# Patient Record
Sex: FEMALE | Race: OTHER | HISPANIC OR LATINO
[De-identification: names, ages, dates, MRNs, and addresses within clinical notes are randomized per-mention and may not be internally consistent; named-entity substitution may affect disease eponyms.]

---

## 2017-06-28 ENCOUNTER — APPOINTMENT (OUTPATIENT)
Dept: MAMMOGRAPHY | Facility: CLINIC | Age: 47
End: 2017-06-28

## 2017-06-28 ENCOUNTER — OUTPATIENT (OUTPATIENT)
Dept: OUTPATIENT SERVICES | Facility: HOSPITAL | Age: 47
LOS: 1 days | End: 2017-06-28
Payer: COMMERCIAL

## 2017-06-28 DIAGNOSIS — Z00.8 ENCOUNTER FOR OTHER GENERAL EXAMINATION: ICD-10-CM

## 2017-06-28 PROCEDURE — 77063 BREAST TOMOSYNTHESIS BI: CPT

## 2017-06-28 PROCEDURE — 77067 SCR MAMMO BI INCL CAD: CPT

## 2017-07-11 ENCOUNTER — APPOINTMENT (OUTPATIENT)
Dept: ULTRASOUND IMAGING | Facility: CLINIC | Age: 47
End: 2017-07-11

## 2017-07-18 ENCOUNTER — OUTPATIENT (OUTPATIENT)
Dept: OUTPATIENT SERVICES | Facility: HOSPITAL | Age: 47
LOS: 1 days | End: 2017-07-18
Payer: COMMERCIAL

## 2017-07-18 ENCOUNTER — APPOINTMENT (OUTPATIENT)
Dept: ULTRASOUND IMAGING | Facility: CLINIC | Age: 47
End: 2017-07-18

## 2017-07-18 DIAGNOSIS — Z00.8 ENCOUNTER FOR OTHER GENERAL EXAMINATION: ICD-10-CM

## 2017-07-18 PROCEDURE — 76641 ULTRASOUND BREAST COMPLETE: CPT

## 2018-04-09 ENCOUNTER — APPOINTMENT (OUTPATIENT)
Dept: ULTRASOUND IMAGING | Facility: CLINIC | Age: 48
End: 2018-04-09
Payer: MEDICAID

## 2018-04-09 ENCOUNTER — OUTPATIENT (OUTPATIENT)
Dept: OUTPATIENT SERVICES | Facility: HOSPITAL | Age: 48
LOS: 1 days | End: 2018-04-09
Payer: MEDICAID

## 2018-04-09 ENCOUNTER — APPOINTMENT (OUTPATIENT)
Dept: MAMMOGRAPHY | Facility: CLINIC | Age: 48
End: 2018-04-09
Payer: MEDICAID

## 2018-04-09 DIAGNOSIS — R92.8 OTHER ABNORMAL AND INCONCLUSIVE FINDINGS ON DIAGNOSTIC IMAGING OF BREAST: ICD-10-CM

## 2018-04-09 PROCEDURE — 76641 ULTRASOUND BREAST COMPLETE: CPT | Mod: 26,50

## 2018-04-09 PROCEDURE — 76641 ULTRASOUND BREAST COMPLETE: CPT

## 2018-04-09 PROCEDURE — 77062 BREAST TOMOSYNTHESIS BI: CPT | Mod: 26

## 2018-04-09 PROCEDURE — 77066 DX MAMMO INCL CAD BI: CPT

## 2018-04-09 PROCEDURE — G0279: CPT | Mod: 26

## 2018-04-09 PROCEDURE — G0279: CPT

## 2018-04-09 PROCEDURE — 77066 DX MAMMO INCL CAD BI: CPT | Mod: 26

## 2018-04-30 ENCOUNTER — APPOINTMENT (OUTPATIENT)
Dept: SURGERY | Facility: CLINIC | Age: 48
End: 2018-04-30

## 2018-06-26 ENCOUNTER — APPOINTMENT (OUTPATIENT)
Dept: SURGERY | Facility: CLINIC | Age: 48
End: 2018-06-26
Payer: MEDICAID

## 2018-06-26 VITALS
HEIGHT: 64 IN | OXYGEN SATURATION: 95 % | BODY MASS INDEX: 30.39 KG/M2 | DIASTOLIC BLOOD PRESSURE: 67 MMHG | HEART RATE: 69 BPM | SYSTOLIC BLOOD PRESSURE: 109 MMHG | WEIGHT: 178 LBS

## 2018-06-26 DIAGNOSIS — Z00.00 ENCOUNTER FOR GENERAL ADULT MEDICAL EXAMINATION W/OUT ABNORMAL FINDINGS: ICD-10-CM

## 2018-06-26 DIAGNOSIS — Z83.3 FAMILY HISTORY OF DIABETES MELLITUS: ICD-10-CM

## 2018-06-26 DIAGNOSIS — Z87.891 PERSONAL HISTORY OF NICOTINE DEPENDENCE: ICD-10-CM

## 2018-06-26 DIAGNOSIS — Z82.0 FAMILY HISTORY OF EPILEPSY AND OTHER DISEASES OF THE NERVOUS SYSTEM: ICD-10-CM

## 2018-06-26 DIAGNOSIS — Z82.49 FAMILY HISTORY OF ISCHEMIC HEART DISEASE AND OTHER DISEASES OF THE CIRCULATORY SYSTEM: ICD-10-CM

## 2018-06-26 PROCEDURE — 99204 OFFICE O/P NEW MOD 45 MIN: CPT

## 2018-06-27 PROBLEM — Z82.0 FAMILY HISTORY OF PARKINSONISM: Status: ACTIVE | Noted: 2018-06-26

## 2018-06-27 PROBLEM — Z83.3 FAMILY HISTORY OF DIABETES MELLITUS: Status: ACTIVE | Noted: 2018-06-26

## 2018-06-27 PROBLEM — Z00.00 ENCOUNTER FOR PREVENTIVE HEALTH EXAMINATION: Status: ACTIVE | Noted: 2017-06-23

## 2018-06-27 PROBLEM — Z87.891 FORMER SMOKER: Status: ACTIVE | Noted: 2018-06-26

## 2018-06-27 PROBLEM — Z82.49 FAMILY HISTORY OF HYPERTENSION: Status: ACTIVE | Noted: 2018-06-26

## 2018-06-27 PROBLEM — Z82.0 FAMILY HISTORY OF PARKINSON'S DISEASE: Status: ACTIVE | Noted: 2018-06-26

## 2018-07-03 ENCOUNTER — APPOINTMENT (OUTPATIENT)
Dept: DERMATOLOGY | Facility: CLINIC | Age: 48
End: 2018-07-03

## 2018-11-16 ENCOUNTER — OUTPATIENT (OUTPATIENT)
Dept: OUTPATIENT SERVICES | Facility: HOSPITAL | Age: 48
LOS: 1 days | End: 2018-11-16
Payer: COMMERCIAL

## 2018-11-16 ENCOUNTER — APPOINTMENT (OUTPATIENT)
Dept: ULTRASOUND IMAGING | Facility: CLINIC | Age: 48
End: 2018-11-16
Payer: COMMERCIAL

## 2018-11-16 DIAGNOSIS — R92.8 OTHER ABNORMAL AND INCONCLUSIVE FINDINGS ON DIAGNOSTIC IMAGING OF BREAST: ICD-10-CM

## 2018-11-16 PROCEDURE — 76642 ULTRASOUND BREAST LIMITED: CPT

## 2018-11-16 PROCEDURE — 76642 ULTRASOUND BREAST LIMITED: CPT | Mod: 26,LT

## 2019-08-20 ENCOUNTER — APPOINTMENT (OUTPATIENT)
Dept: ULTRASOUND IMAGING | Facility: CLINIC | Age: 49
End: 2019-08-20
Payer: COMMERCIAL

## 2019-08-20 ENCOUNTER — APPOINTMENT (OUTPATIENT)
Dept: MAMMOGRAPHY | Facility: CLINIC | Age: 49
End: 2019-08-20
Payer: COMMERCIAL

## 2019-08-20 ENCOUNTER — OUTPATIENT (OUTPATIENT)
Dept: OUTPATIENT SERVICES | Facility: HOSPITAL | Age: 49
LOS: 1 days | End: 2019-08-20
Payer: COMMERCIAL

## 2019-08-20 DIAGNOSIS — R92.8 OTHER ABNORMAL AND INCONCLUSIVE FINDINGS ON DIAGNOSTIC IMAGING OF BREAST: ICD-10-CM

## 2019-08-20 PROCEDURE — 77066 DX MAMMO INCL CAD BI: CPT

## 2019-08-20 PROCEDURE — G0279: CPT

## 2019-08-20 PROCEDURE — 77066 DX MAMMO INCL CAD BI: CPT | Mod: 26

## 2019-08-20 PROCEDURE — 76641 ULTRASOUND BREAST COMPLETE: CPT | Mod: 26,50

## 2019-08-20 PROCEDURE — G0279: CPT | Mod: 26

## 2019-08-20 PROCEDURE — 76641 ULTRASOUND BREAST COMPLETE: CPT

## 2020-04-08 ENCOUNTER — APPOINTMENT (OUTPATIENT)
Dept: OBGYN | Facility: CLINIC | Age: 50
End: 2020-04-08
Payer: COMMERCIAL

## 2020-04-08 DIAGNOSIS — E78.5 HYPERLIPIDEMIA, UNSPECIFIED: ICD-10-CM

## 2020-04-08 DIAGNOSIS — Z80.49 FAMILY HISTORY OF MALIGNANT NEOPLASM OF OTHER GENITAL ORGANS: ICD-10-CM

## 2020-04-08 DIAGNOSIS — R73.03 PREDIABETES.: ICD-10-CM

## 2020-04-08 DIAGNOSIS — N39.0 URINARY TRACT INFECTION, SITE NOT SPECIFIED: ICD-10-CM

## 2020-04-08 DIAGNOSIS — Z80.0 FAMILY HISTORY OF MALIGNANT NEOPLASM OF DIGESTIVE ORGANS: ICD-10-CM

## 2020-04-08 LAB
BILIRUB UR QL STRIP: NORMAL
COLLECTION METHOD: NORMAL
DATE COLLECTED: NORMAL
GLUCOSE UR-MCNC: NORMAL
HCG UR QL: 0.2 EU/DL
HEMOCCULT SP1 STL QL: NEGATIVE
HGB UR QL STRIP.AUTO: ABNORMAL
KETONES UR-MCNC: NORMAL
LEUKOCYTE ESTERASE UR QL STRIP: ABNORMAL
NITRITE UR QL STRIP: NORMAL
PH UR STRIP: 6
PROT UR STRIP-MCNC: NORMAL
QUALITY CONTROL: YES
SP GR UR STRIP: 1.02

## 2020-04-08 PROCEDURE — 99386 PREV VISIT NEW AGE 40-64: CPT

## 2020-04-08 PROCEDURE — 82270 OCCULT BLOOD FECES: CPT

## 2020-04-08 PROCEDURE — 99204 OFFICE O/P NEW MOD 45 MIN: CPT | Mod: 25

## 2020-04-08 PROCEDURE — 81003 URINALYSIS AUTO W/O SCOPE: CPT | Mod: QW

## 2020-04-08 NOTE — PHYSICAL EXAM
[Awake] : awake [Alert] : alert [Soft] : soft [Oriented x3] : oriented to person, place, and time [Normal] : uterus [No Bleeding] : there was no active vaginal bleeding [Uterine Adnexae] : were not tender and not enlarged [Acute Distress] : no acute distress [LAD] : no lymphadenopathy [Thyroid Nodule] : no thyroid nodule [Goiter] : no goiter [Mass] : no breast mass [Nipple Discharge] : no nipple discharge [Axillary LAD] : no axillary lymphadenopathy [Tender] : non tender [Distended] : not distended [H/Smegaly] : no hepatosplenomegaly [Depressed Mood] : not depressed [Flat Affect] : affect not flat [Labia Majora] : labia major [Labia Minora] : labia minora [Discharge] : had no discharge [Normal Position] : in a normal position [Tenderness] : nontender [Enlarged ___ wks] : not enlarged [Mass ___ cm] : no uterine mass was palpated [Adnexa Tenderness] : were not tender [Ovarian Mass (___ Cm)] : there were no adnexal masses [No Tenderness] : no rectal tenderness [Occult Blood] : occult blood test from digital rectal exam was negative [de-identified] : breast exam: supine and upright   has bilat breast implants [FreeTextEntry7] : bladder very tender on exam

## 2020-04-10 LAB
APPEARANCE: CLEAR
BACTERIA UR CULT: NORMAL
BACTERIA: NEGATIVE
BILIRUBIN URINE: NEGATIVE
BLOOD URINE: NEGATIVE
C TRACH RRNA SPEC QL NAA+PROBE: NOT DETECTED
COLOR: YELLOW
GLUCOSE QUALITATIVE U: NEGATIVE
HYALINE CASTS: 1 /LPF
KETONES URINE: NEGATIVE
LEUKOCYTE ESTERASE URINE: NEGATIVE
MICROSCOPIC-UA: NORMAL
N GONORRHOEA RRNA SPEC QL NAA+PROBE: NOT DETECTED
NITRITE URINE: NEGATIVE
PH URINE: 6
PROTEIN URINE: NORMAL
RED BLOOD CELLS URINE: 2 /HPF
SOURCE TP AMPLIFICATION: NORMAL
SPECIFIC GRAVITY URINE: 1.03
SQUAMOUS EPITHELIAL CELLS: 3 /HPF
UROBILINOGEN URINE: NORMAL
WHITE BLOOD CELLS URINE: 2 /HPF

## 2020-04-16 LAB
CYTOLOGY CVX/VAG DOC THIN PREP: NORMAL
HPV HIGH+LOW RISK DNA PNL CVX: NOT DETECTED

## 2020-04-20 ENCOUNTER — APPOINTMENT (OUTPATIENT)
Dept: OBGYN | Facility: CLINIC | Age: 50
End: 2020-04-20

## 2020-07-01 ENCOUNTER — APPOINTMENT (OUTPATIENT)
Dept: ULTRASOUND IMAGING | Facility: CLINIC | Age: 50
End: 2020-07-01
Payer: COMMERCIAL

## 2020-07-01 ENCOUNTER — RESULT REVIEW (OUTPATIENT)
Age: 50
End: 2020-07-01

## 2020-07-01 ENCOUNTER — OUTPATIENT (OUTPATIENT)
Dept: OUTPATIENT SERVICES | Facility: HOSPITAL | Age: 50
LOS: 1 days | End: 2020-07-01
Payer: COMMERCIAL

## 2020-07-01 DIAGNOSIS — Z00.00 ENCOUNTER FOR GENERAL ADULT MEDICAL EXAMINATION WITHOUT ABNORMAL FINDINGS: ICD-10-CM

## 2020-07-01 PROCEDURE — 77080 DXA BONE DENSITY AXIAL: CPT | Mod: 26

## 2020-07-01 PROCEDURE — 76700 US EXAM ABDOM COMPLETE: CPT

## 2020-07-01 PROCEDURE — 76830 TRANSVAGINAL US NON-OB: CPT

## 2020-07-01 PROCEDURE — 76856 US EXAM PELVIC COMPLETE: CPT

## 2020-07-01 PROCEDURE — 76700 US EXAM ABDOM COMPLETE: CPT | Mod: 26

## 2020-07-01 PROCEDURE — 76856 US EXAM PELVIC COMPLETE: CPT | Mod: 26

## 2020-07-01 PROCEDURE — 77080 DXA BONE DENSITY AXIAL: CPT

## 2020-07-01 PROCEDURE — 76830 TRANSVAGINAL US NON-OB: CPT | Mod: 26

## 2020-08-22 ENCOUNTER — OUTPATIENT (OUTPATIENT)
Dept: OUTPATIENT SERVICES | Facility: HOSPITAL | Age: 50
LOS: 1 days | End: 2020-08-22
Payer: COMMERCIAL

## 2020-08-22 ENCOUNTER — RESULT REVIEW (OUTPATIENT)
Age: 50
End: 2020-08-22

## 2020-08-22 ENCOUNTER — APPOINTMENT (OUTPATIENT)
Dept: MAMMOGRAPHY | Facility: CLINIC | Age: 50
End: 2020-08-22
Payer: COMMERCIAL

## 2020-08-22 ENCOUNTER — APPOINTMENT (OUTPATIENT)
Dept: ULTRASOUND IMAGING | Facility: CLINIC | Age: 50
End: 2020-08-22
Payer: COMMERCIAL

## 2020-08-22 DIAGNOSIS — Z12.31 ENCOUNTER FOR SCREENING MAMMOGRAM FOR MALIGNANT NEOPLASM OF BREAST: ICD-10-CM

## 2020-08-22 PROCEDURE — 77067 SCR MAMMO BI INCL CAD: CPT | Mod: 26

## 2020-08-22 PROCEDURE — 77067 SCR MAMMO BI INCL CAD: CPT

## 2020-08-22 PROCEDURE — 76641 ULTRASOUND BREAST COMPLETE: CPT | Mod: 26,50

## 2020-08-22 PROCEDURE — 76641 ULTRASOUND BREAST COMPLETE: CPT

## 2020-08-22 PROCEDURE — 77063 BREAST TOMOSYNTHESIS BI: CPT | Mod: 26

## 2020-08-22 PROCEDURE — 77063 BREAST TOMOSYNTHESIS BI: CPT

## 2020-12-23 PROBLEM — N39.0 ACUTE UTI: Status: RESOLVED | Noted: 2020-04-08 | Resolved: 2020-12-23

## 2021-05-13 ENCOUNTER — APPOINTMENT (OUTPATIENT)
Dept: ULTRASOUND IMAGING | Facility: CLINIC | Age: 51
End: 2021-05-13

## 2021-05-14 ENCOUNTER — APPOINTMENT (OUTPATIENT)
Dept: GASTROENTEROLOGY | Facility: CLINIC | Age: 51
End: 2021-05-14

## 2021-08-27 ENCOUNTER — APPOINTMENT (OUTPATIENT)
Dept: GASTROENTEROLOGY | Facility: CLINIC | Age: 51
End: 2021-08-27

## 2021-08-30 ENCOUNTER — APPOINTMENT (OUTPATIENT)
Dept: OBGYN | Facility: CLINIC | Age: 51
End: 2021-08-30
Payer: COMMERCIAL

## 2021-08-30 VITALS
HEIGHT: 64 IN | WEIGHT: 178 LBS | DIASTOLIC BLOOD PRESSURE: 68 MMHG | SYSTOLIC BLOOD PRESSURE: 104 MMHG | BODY MASS INDEX: 30.39 KG/M2

## 2021-08-30 LAB
DATE COLLECTED: NORMAL
HEMOCCULT SP1 STL QL: NEGATIVE
QUALITY CONTROL: YES

## 2021-08-30 PROCEDURE — 82270 OCCULT BLOOD FECES: CPT

## 2021-08-30 PROCEDURE — 99396 PREV VISIT EST AGE 40-64: CPT

## 2021-08-30 NOTE — PHYSICAL EXAM
[Awake] : awake [Alert] : alert [Soft] : soft [Oriented x3] : oriented to person, place, and time [Labia Majora] : labia major [Normal] : uterus [Labia Minora] : labia minora [No Bleeding] : there was no active vaginal bleeding [Normal Position] : in a normal position [Uterine Adnexae] : were not tender and not enlarged [No Tenderness] : no rectal tenderness [Acute Distress] : no acute distress [LAD] : no lymphadenopathy [Thyroid Nodule] : no thyroid nodule [Goiter] : no goiter [Mass] : no breast mass [Nipple Discharge] : no nipple discharge [Axillary LAD] : no axillary lymphadenopathy [Tender] : non tender [Distended] : not distended [H/Smegaly] : no hepatosplenomegaly [Depressed Mood] : not depressed [Flat Affect] : affect not flat [Discharge] : had no discharge [Tenderness] : nontender [Enlarged ___ wks] : not enlarged [Mass ___ cm] : no uterine mass was palpated [Adnexa Tenderness] : were not tender [Occult Blood] : occult blood test from digital rectal exam was negative [Ovarian Mass (___ Cm)] : there were no adnexal masses [de-identified] : breast exam: supine and upright   has bilat breast implants (non textured according to pt) [FreeTextEntry7] : bladder very tender on exam

## 2021-09-03 LAB
APPEARANCE: CLEAR
BACTERIA UR CULT: NORMAL
BACTERIA: NEGATIVE
BILIRUBIN URINE: NEGATIVE
BLOOD URINE: NEGATIVE
C TRACH RRNA SPEC QL NAA+PROBE: NOT DETECTED
COLOR: YELLOW
CYTOLOGY CVX/VAG DOC THIN PREP: NORMAL
GLUCOSE QUALITATIVE U: NEGATIVE
HPV HIGH+LOW RISK DNA PNL CVX: NOT DETECTED
HYALINE CASTS: 3 /LPF
KETONES URINE: NEGATIVE
LEUKOCYTE ESTERASE URINE: ABNORMAL
MICROSCOPIC-UA: NORMAL
N GONORRHOEA RRNA SPEC QL NAA+PROBE: NOT DETECTED
NITRITE URINE: NEGATIVE
PH URINE: 5.5
PROTEIN URINE: NORMAL
RED BLOOD CELLS URINE: 2 /HPF
SOURCE TP AMPLIFICATION: NORMAL
SPECIFIC GRAVITY URINE: 1.03
SQUAMOUS EPITHELIAL CELLS: 5 /HPF
UROBILINOGEN URINE: NORMAL
WHITE BLOOD CELLS URINE: 7 /HPF

## 2021-09-16 ENCOUNTER — APPOINTMENT (OUTPATIENT)
Dept: ULTRASOUND IMAGING | Facility: CLINIC | Age: 51
End: 2021-09-16
Payer: COMMERCIAL

## 2021-09-16 ENCOUNTER — RESULT REVIEW (OUTPATIENT)
Age: 51
End: 2021-09-16

## 2021-09-16 ENCOUNTER — APPOINTMENT (OUTPATIENT)
Dept: MAMMOGRAPHY | Facility: CLINIC | Age: 51
End: 2021-09-16
Payer: COMMERCIAL

## 2021-09-16 ENCOUNTER — OUTPATIENT (OUTPATIENT)
Dept: OUTPATIENT SERVICES | Facility: HOSPITAL | Age: 51
LOS: 1 days | End: 2021-09-16
Payer: COMMERCIAL

## 2021-09-16 DIAGNOSIS — R92.2 INCONCLUSIVE MAMMOGRAM: ICD-10-CM

## 2021-09-16 PROCEDURE — 76856 US EXAM PELVIC COMPLETE: CPT | Mod: 26

## 2021-09-16 PROCEDURE — 76830 TRANSVAGINAL US NON-OB: CPT | Mod: 26

## 2021-09-16 PROCEDURE — 76641 ULTRASOUND BREAST COMPLETE: CPT | Mod: 26,50

## 2021-09-16 PROCEDURE — 77067 SCR MAMMO BI INCL CAD: CPT

## 2021-09-16 PROCEDURE — 77063 BREAST TOMOSYNTHESIS BI: CPT | Mod: 26

## 2021-09-16 PROCEDURE — 77063 BREAST TOMOSYNTHESIS BI: CPT

## 2021-09-16 PROCEDURE — 77067 SCR MAMMO BI INCL CAD: CPT | Mod: 26

## 2021-09-16 PROCEDURE — 76641 ULTRASOUND BREAST COMPLETE: CPT

## 2021-09-16 PROCEDURE — 76830 TRANSVAGINAL US NON-OB: CPT

## 2021-09-16 PROCEDURE — 76856 US EXAM PELVIC COMPLETE: CPT

## 2021-09-24 ENCOUNTER — NON-APPOINTMENT (OUTPATIENT)
Age: 51
End: 2021-09-24

## 2021-11-22 ENCOUNTER — APPOINTMENT (OUTPATIENT)
Dept: GASTROENTEROLOGY | Facility: CLINIC | Age: 51
End: 2021-11-22
Payer: COMMERCIAL

## 2021-11-22 VITALS
OXYGEN SATURATION: 98 % | WEIGHT: 174 LBS | RESPIRATION RATE: 80 BRPM | HEIGHT: 64 IN | DIASTOLIC BLOOD PRESSURE: 70 MMHG | SYSTOLIC BLOOD PRESSURE: 110 MMHG | BODY MASS INDEX: 29.71 KG/M2 | TEMPERATURE: 98 F | HEART RATE: 68 BPM

## 2021-11-22 DIAGNOSIS — Z12.11 ENCOUNTER FOR SCREENING FOR MALIGNANT NEOPLASM OF COLON: ICD-10-CM

## 2021-11-22 PROCEDURE — 99203 OFFICE O/P NEW LOW 30 MIN: CPT

## 2021-11-22 RX ORDER — ATORVASTATIN CALCIUM 10 MG/1
10 TABLET, FILM COATED ORAL
Refills: 0 | Status: ACTIVE | COMMUNITY
Start: 2021-11-22

## 2021-11-22 RX ORDER — METFORMIN HYDROCHLORIDE 625 MG/1
TABLET ORAL
Refills: 0 | Status: DISCONTINUED | COMMUNITY
End: 2021-11-22

## 2021-11-22 RX ORDER — ATORVASTATIN CALCIUM 80 MG/1
TABLET, FILM COATED ORAL
Refills: 0 | Status: DISCONTINUED | COMMUNITY
End: 2021-11-22

## 2021-11-22 NOTE — ASSESSMENT
[FreeTextEntry1] : Patient is a 51 year female, with PMH HLD and DMII, who presents for initial colon cancer screening with colonoscopy. \par \par Colonoscopy to be scheduled. I have discussed the indications, risks and benefits of procedure with patient. Risks include, but not limited to, bleeding, perforation, infection, and reaction to anesthesia. Alternatives to colonoscopy discussed with patient. Patient was given the opportunity to ask questions, all questions were answered. The patient agrees to proceed with colonoscopy. Patient is medically optimized for colonoscopy. \par \par Miralax/dulcolax prep to be used. Bowel prep instructions discussed at length. \par \par CBC/CMP, PT/INR ordered prior to procedure\par \par I evaluated this patient with Rosalinda and agree with the above assessment and management plan for low risk screening colonoscopy in a patient with no previous colonoscopies.  Her ASA classification is 2 she is medically optimized for the proposed colonoscopy and appeared to understand all of the above instructions, information, and management plan which were explained to her today in Bruneian by myself who speaks Bruneian.

## 2021-11-22 NOTE — HISTORY OF PRESENT ILLNESS
[de-identified] : \par Patient is a 51 year female, with PMH HLD, who presents for colon cancer screening. Patient has not had a colonoscopy in the past. Patient denies a family h/o colon polyps or colon cancer. \par \par Patient overall feeling well, asymptomatic. \par \par Patient denies pyrosis, dysphagia, abdominal pain, change in BMs, rectal bleeding, nausea, vomiting, or unexplained weight loss. \par \par Patient denies any significant cardiac or pulmonary conditions.\par \par Patient states her brother had a h/o possible pancreatic cancer but by the time they found out about it, it was metastatic and the primary cancer was not determined, per pt. \par \par \par

## 2021-11-22 NOTE — REASON FOR VISIT
[Consultation] : a consultation visit [[employer] ] : provided by [employer]   [Time Spent: ____ minutes] : Total time spent using  services: [unfilled] minutes. The patient's primary language is not English thus required  services. [FreeTextEntry1] : colon cancer screening  [Interpreters_IDNumber] : [id number] [Interpreters_FullName] : Yesi [TWNoteComboBox1] : Togolese

## 2022-03-08 ENCOUNTER — APPOINTMENT (OUTPATIENT)
Dept: GASTROENTEROLOGY | Facility: GI CENTER | Age: 52
End: 2022-03-08

## 2022-09-15 ENCOUNTER — APPOINTMENT (OUTPATIENT)
Dept: OBGYN | Facility: CLINIC | Age: 52
End: 2022-09-15

## 2022-09-15 VITALS
WEIGHT: 173 LBS | BODY MASS INDEX: 29.53 KG/M2 | HEIGHT: 64 IN | DIASTOLIC BLOOD PRESSURE: 66 MMHG | SYSTOLIC BLOOD PRESSURE: 106 MMHG

## 2022-09-15 DIAGNOSIS — R92.2 INCONCLUSIVE MAMMOGRAM: ICD-10-CM

## 2022-09-15 DIAGNOSIS — Z12.39 ENCOUNTER FOR OTHER SCREENING FOR MALIGNANT NEOPLASM OF BREAST: ICD-10-CM

## 2022-09-15 DIAGNOSIS — Z12.31 ENCOUNTER FOR SCREENING MAMMOGRAM FOR MALIGNANT NEOPLASM OF BREAST: ICD-10-CM

## 2022-09-15 DIAGNOSIS — Z12.11 ENCOUNTER FOR SCREENING FOR MALIGNANT NEOPLASM OF COLON: ICD-10-CM

## 2022-09-15 DIAGNOSIS — Z92.89 PERSONAL HISTORY OF OTHER MEDICAL TREATMENT: ICD-10-CM

## 2022-09-15 DIAGNOSIS — Z13.820 ENCOUNTER FOR SCREENING FOR OSTEOPOROSIS: ICD-10-CM

## 2022-09-15 PROCEDURE — 99396 PREV VISIT EST AGE 40-64: CPT

## 2022-09-15 RX ORDER — EUCALYPTUS OIL/MENTHOL/CAMPHOR 1.2%-4.8%
OINTMENT (GRAM) TOPICAL
Refills: 0 | Status: ACTIVE | COMMUNITY

## 2022-09-15 RX ORDER — CYCLOBENZAPRINE HYDROCHLORIDE 10 MG/1
10 TABLET, FILM COATED ORAL
Qty: 20 | Refills: 0 | Status: DISCONTINUED | COMMUNITY
Start: 2021-07-18 | End: 2022-09-15

## 2022-09-15 RX ORDER — FLUTICASONE PROPIONATE 50 UG/1
50 SPRAY, METERED NASAL
Qty: 16 | Refills: 0 | Status: DISCONTINUED | COMMUNITY
Start: 2021-05-08 | End: 2022-09-15

## 2022-09-15 RX ORDER — IBUPROFEN 800 MG/1
800 TABLET, FILM COATED ORAL
Qty: 30 | Refills: 0 | Status: DISCONTINUED | COMMUNITY
Start: 2021-07-18 | End: 2022-09-15

## 2022-09-15 RX ORDER — PREDNISONE 50 MG/1
50 TABLET ORAL
Qty: 5 | Refills: 0 | Status: DISCONTINUED | COMMUNITY
Start: 2021-07-18 | End: 2022-09-15

## 2022-09-15 RX ORDER — METFORMIN HYDROCHLORIDE 625 MG/1
TABLET ORAL
Refills: 0 | Status: ACTIVE | COMMUNITY

## 2022-09-16 PROBLEM — R92.2 DENSE BREASTS: Status: RESOLVED | Noted: 2020-04-08 | Resolved: 2022-09-16

## 2022-09-16 LAB — HPV HIGH+LOW RISK DNA PNL CVX: NOT DETECTED

## 2022-09-16 NOTE — PHYSICAL EXAM
[Appropriately responsive] : appropriately responsive [Alert] : alert [No Acute Distress] : no acute distress [Soft] : soft [Non-tender] : non-tender [Non-distended] : non-distended [Oriented x3] : oriented x3 [Examination Of The Breasts] : a normal appearance [No Discharge] : no discharge [No Masses] : no breast masses were palpable [Labia Majora] : normal [Labia Minora] : normal [No Bleeding] : There was no active vaginal bleeding [Normal] : normal [Uterine Adnexae] : normal [] : implants [Atrophy] : atrophy [Dry Mucosa] : dry mucosa [FreeTextEntry9] : Stool for occult blood.

## 2022-09-16 NOTE — HISTORY OF PRESENT ILLNESS
[postmenopausal] : postmenopausal [N] : Patient is not sexually active [Y] : Positive pregnancy history [unknown] : Patient is unsure of the date of her LMP [Previously active] : previously active [LMP unknown] : LMP unknown [FreeTextEntry1] : ADALI 53 yo  LMP unknown is here today for an annual exam. She is doing well with no gynecological complaints. \par \par Last mammogram and breast sonogram on 2021 revealed BR2. \par \par Last pap smear on 2022 was normal, HPV negative. \par  [Mammogramdate] : 9/17/2021 [TextBox_19] : B2 [BreastSonogramDate] : 9/17/2021 [TextBox_25] : B2 [PapSmeardate] : 8/30/2021 [TextBox_31] : WNL [GonorrheaDate] : 8/30/2021 [TextBox_63] : NEG [ChlamydiaDate] : 8/30/2021 [TextBox_68] : NEG [HPVDate] : 8/30/2021 [TextBox_78] : NEG [PGHxTotal] : 2 [[facility] Center[facility]ulerm] : 1 [[facility]] : 1 [PGHxABSpont] : 1

## 2022-09-16 NOTE — END OF VISIT
[FreeTextEntry3] : I, Tari Keller solely acted as scribe for Dr. Rosalia Alvarez on 09/15/2022 \par All medical entries made by the Scribe were at my, Dr. Alvarez’s, direction and personally\par dictated by me on 09/15/2022 . I have reviewed the chart and agree that the record\par accurately reflects my personal performance of the history, physical exam, assessment and plan. I\par have also personally directed, reviewed, and agreed with the chart.

## 2022-09-16 NOTE — DISCUSSION/SUMMARY
[FreeTextEntry1] : Benign breast and pelvic exam. Bilateral implants noted. Pap done today. Stool for occult blood was negative.\par \par Prescription for mammogram screening and breast US given.\par \par Self-breast exam reviewed.\par \par She will follow up annually and as needed.\par

## 2022-09-22 LAB — CYTOLOGY CVX/VAG DOC THIN PREP: NORMAL

## 2022-10-01 ENCOUNTER — RESULT REVIEW (OUTPATIENT)
Age: 52
End: 2022-10-01

## 2022-10-01 ENCOUNTER — APPOINTMENT (OUTPATIENT)
Dept: MAMMOGRAPHY | Facility: CLINIC | Age: 52
End: 2022-10-01

## 2022-10-01 ENCOUNTER — OUTPATIENT (OUTPATIENT)
Dept: OUTPATIENT SERVICES | Facility: HOSPITAL | Age: 52
LOS: 1 days | End: 2022-10-01
Payer: MEDICAID

## 2022-10-01 ENCOUNTER — APPOINTMENT (OUTPATIENT)
Dept: ULTRASOUND IMAGING | Facility: CLINIC | Age: 52
End: 2022-10-01

## 2022-10-01 DIAGNOSIS — Z00.8 ENCOUNTER FOR OTHER GENERAL EXAMINATION: ICD-10-CM

## 2022-10-01 DIAGNOSIS — R92.8 OTHER ABNORMAL AND INCONCLUSIVE FINDINGS ON DIAGNOSTIC IMAGING OF BREAST: ICD-10-CM

## 2022-10-01 PROCEDURE — 76641 ULTRASOUND BREAST COMPLETE: CPT

## 2022-10-01 PROCEDURE — 77067 SCR MAMMO BI INCL CAD: CPT

## 2022-10-01 PROCEDURE — 76641 ULTRASOUND BREAST COMPLETE: CPT | Mod: 26,50

## 2022-10-01 PROCEDURE — 77063 BREAST TOMOSYNTHESIS BI: CPT | Mod: 26

## 2022-10-01 PROCEDURE — 77063 BREAST TOMOSYNTHESIS BI: CPT

## 2022-10-01 PROCEDURE — 77067 SCR MAMMO BI INCL CAD: CPT | Mod: 26

## 2022-10-05 DIAGNOSIS — N63.0 UNSPECIFIED LUMP IN UNSPECIFIED BREAST: ICD-10-CM

## 2022-10-12 ENCOUNTER — NON-APPOINTMENT (OUTPATIENT)
Age: 52
End: 2022-10-12

## 2022-10-12 ENCOUNTER — APPOINTMENT (OUTPATIENT)
Dept: ULTRASOUND IMAGING | Facility: CLINIC | Age: 52
End: 2022-10-12

## 2022-10-12 ENCOUNTER — OUTPATIENT (OUTPATIENT)
Dept: OUTPATIENT SERVICES | Facility: HOSPITAL | Age: 52
LOS: 1 days | End: 2022-10-12
Payer: COMMERCIAL

## 2022-10-12 ENCOUNTER — RESULT REVIEW (OUTPATIENT)
Age: 52
End: 2022-10-12

## 2022-10-12 DIAGNOSIS — N64.89 OTHER SPECIFIED DISORDERS OF BREAST: ICD-10-CM

## 2022-10-12 PROCEDURE — 76642 ULTRASOUND BREAST LIMITED: CPT | Mod: 26,LT

## 2022-10-12 PROCEDURE — 76642 ULTRASOUND BREAST LIMITED: CPT

## 2023-05-19 ENCOUNTER — OFFICE (OUTPATIENT)
Dept: URBAN - METROPOLITAN AREA CLINIC 94 | Facility: CLINIC | Age: 53
Setting detail: OPHTHALMOLOGY
End: 2023-05-19
Payer: COMMERCIAL

## 2023-05-19 DIAGNOSIS — H01.001: ICD-10-CM

## 2023-05-19 DIAGNOSIS — E11.9: ICD-10-CM

## 2023-05-19 DIAGNOSIS — H01.004: ICD-10-CM

## 2023-05-19 DIAGNOSIS — H52.4: ICD-10-CM

## 2023-05-19 DIAGNOSIS — H04.123: ICD-10-CM

## 2023-05-19 PROBLEM — H25.13 CATARACT SENILE NUCLEAR SCLEROSIS; BOTH EYES: Status: ACTIVE | Noted: 2023-05-19

## 2023-05-19 PROCEDURE — 92250 FUNDUS PHOTOGRAPHY W/I&R: CPT | Performed by: OPHTHALMOLOGY

## 2023-05-19 PROCEDURE — 92004 COMPRE OPH EXAM NEW PT 1/>: CPT | Performed by: OPHTHALMOLOGY

## 2023-05-19 PROCEDURE — 92015 DETERMINE REFRACTIVE STATE: CPT | Performed by: OPHTHALMOLOGY

## 2023-05-19 ASSESSMENT — REFRACTION_CURRENTRX
OD_VPRISM_DIRECTION: PROGS
OD_ADD: +1.00
OD_AXIS: 106
OS_OVR_VA: 20/
OS_VPRISM_DIRECTION: PROGS
OD_CYLINDER: -1.00
OS_ADD: +1.00
OD_SPHERE: PLANO
OS_CYLINDER: -1.00
OS_SPHERE: -0.25
OS_AXIS: 088
OD_OVR_VA: 20/

## 2023-05-19 ASSESSMENT — REFRACTION_MANIFEST
OD_AXIS: 100
OS_VA2: 20/20
OS_SPHERE: PL
OD_SPHERE: PL
OD_AXIS: 95
OS_VA1: 20/20
OD_CYLINDER: -1.25
OS_CYLINDER: -0.75
OD_ADD: +1.50
OD_VA1: 20/20
OD_CYLINDER: -1.25
OD_ADD: +1.75
OS_CYLINDER: -1.25
OD_VA2: 20/20
OS_ADD: +1.75
OU_VA: 20/20
OS_AXIS: 85
OS_AXIS: 080
OS_VA1: 20/20
OS_SPHERE: PLANO
OD_SPHERE: +0.50
OD_VA1: 20/20
OS_ADD: +1.50

## 2023-05-19 ASSESSMENT — REFRACTION_AUTOREFRACTION
OD_CYLINDER: -1.50
OD_AXIS: 099
OS_AXIS: 078
OS_SPHERE: +0.25
OD_SPHERE: +0.75
OS_CYLINDER: -1.00

## 2023-05-19 ASSESSMENT — SPHEQUIV_DERIVED
OS_SPHEQUIV: -0.25
OD_SPHEQUIV: -0.125
OD_SPHEQUIV: 0

## 2023-05-19 ASSESSMENT — AXIALLENGTH_DERIVED
OD_AL: 23.3673
OD_AL: 23.3196
OS_AL: 23.4152

## 2023-05-19 ASSESSMENT — KERATOMETRY
OS_K2POWER_DIOPTERS: 44.50
OD_K2POWER_DIOPTERS: 44.50
OD_K1POWER_DIOPTERS: 44.00
OD_AXISANGLE_DEGREES: 101
OS_AXISANGLE_DEGREES: 128
OS_K1POWER_DIOPTERS: 44.00

## 2023-05-19 ASSESSMENT — LID EXAM ASSESSMENTS
OD_BLEPHARITIS: RUL T
OS_BLEPHARITIS: LUL T

## 2023-05-19 ASSESSMENT — TONOMETRY
OD_IOP_MMHG: 18
OS_IOP_MMHG: 17

## 2023-05-19 ASSESSMENT — CONFRONTATIONAL VISUAL FIELD TEST (CVF)
OS_FINDINGS: FULL
OD_FINDINGS: FULL

## 2023-05-19 ASSESSMENT — VISUAL ACUITY
OS_BCVA: 20/25
OD_BCVA: 20/25

## 2024-02-26 ENCOUNTER — RESULT REVIEW (OUTPATIENT)
Age: 54
End: 2024-02-26

## 2024-02-26 ENCOUNTER — APPOINTMENT (OUTPATIENT)
Dept: RADIOLOGY | Facility: CLINIC | Age: 54
End: 2024-02-26
Payer: COMMERCIAL

## 2024-02-26 ENCOUNTER — OUTPATIENT (OUTPATIENT)
Dept: OUTPATIENT SERVICES | Facility: HOSPITAL | Age: 54
LOS: 1 days | End: 2024-02-26
Payer: COMMERCIAL

## 2024-02-26 ENCOUNTER — APPOINTMENT (OUTPATIENT)
Dept: MAMMOGRAPHY | Facility: CLINIC | Age: 54
End: 2024-02-26
Payer: COMMERCIAL

## 2024-02-26 ENCOUNTER — APPOINTMENT (OUTPATIENT)
Dept: ULTRASOUND IMAGING | Facility: CLINIC | Age: 54
End: 2024-02-26
Payer: COMMERCIAL

## 2024-02-26 ENCOUNTER — NON-APPOINTMENT (OUTPATIENT)
Age: 54
End: 2024-02-26

## 2024-02-26 DIAGNOSIS — Z12.39 ENCOUNTER FOR OTHER SCREENING FOR MALIGNANT NEOPLASM OF BREAST: ICD-10-CM

## 2024-02-26 PROCEDURE — 76641 ULTRASOUND BREAST COMPLETE: CPT | Mod: 26,50

## 2024-02-26 PROCEDURE — 77063 BREAST TOMOSYNTHESIS BI: CPT | Mod: 26

## 2024-02-26 PROCEDURE — 77067 SCR MAMMO BI INCL CAD: CPT | Mod: 26

## 2024-02-26 PROCEDURE — 77063 BREAST TOMOSYNTHESIS BI: CPT

## 2024-02-26 PROCEDURE — 76641 ULTRASOUND BREAST COMPLETE: CPT

## 2024-02-26 PROCEDURE — 77085 DXA BONE DENSITY AXL VRT FX: CPT

## 2024-02-26 PROCEDURE — 77085 DXA BONE DENSITY AXL VRT FX: CPT | Mod: 26

## 2024-02-26 PROCEDURE — 77067 SCR MAMMO BI INCL CAD: CPT

## 2024-03-01 ENCOUNTER — NON-APPOINTMENT (OUTPATIENT)
Age: 54
End: 2024-03-01

## 2024-03-18 ENCOUNTER — OFFICE (OUTPATIENT)
Dept: URBAN - METROPOLITAN AREA CLINIC 116 | Facility: CLINIC | Age: 54
Setting detail: OPHTHALMOLOGY
End: 2024-03-18
Payer: COMMERCIAL

## 2024-03-18 DIAGNOSIS — E11.9: ICD-10-CM

## 2024-03-18 DIAGNOSIS — H01.004: ICD-10-CM

## 2024-03-18 DIAGNOSIS — H25.13: ICD-10-CM

## 2024-03-18 DIAGNOSIS — H04.123: ICD-10-CM

## 2024-03-18 DIAGNOSIS — H01.001: ICD-10-CM

## 2024-03-18 PROCEDURE — 92250 FUNDUS PHOTOGRAPHY W/I&R: CPT | Performed by: OPTOMETRIST

## 2024-03-18 PROCEDURE — 92014 COMPRE OPH EXAM EST PT 1/>: CPT | Performed by: OPTOMETRIST

## 2024-03-18 ASSESSMENT — REFRACTION_MANIFEST
OS_VA1: 20/20
OD_ADD: +1.50
OD_ADD: +2.00
OD_AXIS: 95
OS_VA1: 20/20
OD_ADD: +1.75
OS_CYLINDER: -0.75
OS_CYLINDER: -1.25
OD_AXIS: 100
OS_VA1: 20/20
OD_VA1: 20/20
OD_VA2: 20/20
OD_VA1: 20/20
OS_AXIS: 080
OU_VA: 20/20
OD_CYLINDER: -1.25
OS_ADD: +2.00
OD_CYLINDER: -1.25
OS_SPHERE: PLANO
OS_SPHERE: PL
OS_ADD: +1.75
OD_SPHERE: PL
OS_AXIS: 85
OS_ADD: +1.50
OS_AXIS: 080
OD_CYLINDER: -1.00
OD_AXIS: 100
OD_VA1: 20/20
OS_VA2: 20/20
OD_SPHERE: +0.25
OD_SPHERE: +0.50
OS_SPHERE: +0.25
OS_CYLINDER: -0.75

## 2024-03-18 ASSESSMENT — SPHEQUIV_DERIVED
OD_SPHEQUIV: -0.125
OS_SPHEQUIV: -0.125
OD_SPHEQUIV: -0.25

## 2024-03-18 ASSESSMENT — REFRACTION_CURRENTRX
OS_AXIS: 080
OD_OVR_VA: 20/
OS_CYLINDER: -1.00
OD_CYLINDER: -1.00
OD_VPRISM_DIRECTION: PROGS
OD_CYLINDER: -1.25
OD_AXIS: 106
OD_SPHERE: PLANO
OD_SPHERE: +0.75
OD_ADD: +1.50
OS_OVR_VA: 20/
OD_OVR_VA: 20/
OS_ADD: +1.50
OS_SPHERE: -0.25
OS_CYLINDER: -0.75
OS_VPRISM_DIRECTION: PROGS
OS_AXIS: 088
OS_OVR_VA: 20/
OS_ADD: +1.00
OS_VPRISM_DIRECTION: PROGS
OD_VPRISM_DIRECTION: PROGS
OD_ADD: +1.00
OD_AXIS: 100
OS_SPHERE: +0.25

## 2024-03-18 ASSESSMENT — LID EXAM ASSESSMENTS
OD_BLEPHARITIS: RUL T
OS_BLEPHARITIS: LUL T

## 2024-04-08 ENCOUNTER — APPOINTMENT (OUTPATIENT)
Dept: OBGYN | Facility: CLINIC | Age: 54
End: 2024-04-08
Payer: COMMERCIAL

## 2024-04-08 VITALS
HEIGHT: 64 IN | DIASTOLIC BLOOD PRESSURE: 64 MMHG | BODY MASS INDEX: 28.92 KG/M2 | WEIGHT: 169.4 LBS | SYSTOLIC BLOOD PRESSURE: 108 MMHG

## 2024-04-08 DIAGNOSIS — Z98.82 BREAST IMPLANT STATUS: ICD-10-CM

## 2024-04-08 DIAGNOSIS — R10.32 LEFT LOWER QUADRANT PAIN: ICD-10-CM

## 2024-04-08 DIAGNOSIS — R10.2 PELVIC AND PERINEAL PAIN: ICD-10-CM

## 2024-04-08 DIAGNOSIS — Z12.31 ENCOUNTER FOR SCREENING MAMMOGRAM FOR MALIGNANT NEOPLASM OF BREAST: ICD-10-CM

## 2024-04-08 DIAGNOSIS — R92.30 DENSE BREASTS, UNSPECIFIED: ICD-10-CM

## 2024-04-08 DIAGNOSIS — Z12.11 ENCOUNTER FOR SCREENING FOR MALIGNANT NEOPLASM OF COLON: ICD-10-CM

## 2024-04-08 PROCEDURE — 99214 OFFICE O/P EST MOD 30 MIN: CPT | Mod: 25

## 2024-04-08 PROCEDURE — 99396 PREV VISIT EST AGE 40-64: CPT

## 2024-04-08 PROCEDURE — 99459 PELVIC EXAMINATION: CPT

## 2024-04-09 LAB — HPV HIGH+LOW RISK DNA PNL CVX: NOT DETECTED

## 2024-04-10 LAB — BACTERIA UR CULT: NORMAL

## 2024-04-12 LAB — CYTOLOGY CVX/VAG DOC THIN PREP: ABNORMAL

## 2024-04-13 PROBLEM — R10.32 COLICKY LLQ ABDOMINAL PAIN: Status: RESOLVED | Noted: 2020-04-08 | Resolved: 2024-04-13

## 2024-04-13 PROBLEM — R10.2 PELVIC PAIN: Status: RESOLVED | Noted: 2020-04-08 | Resolved: 2024-04-13

## 2024-04-13 NOTE — PLAN
[FreeTextEntry1] : Pelvic pain of unknown etiology: will check a urine culture and a pelvic sono.   Sebaceous cyst noted in LT groin. Recommended warm soaks.   Pap done today.  Prescription for mammogram screening and breast US given. Prescription also given for a breast MRI secondary to  possible ruptured implants. Referral for plastics given secondary to possible ruptured silicone implants.   Prescription for a transvaginal sonogram given secondary to LLQ pain.   Self-breast exam reviewed.  F/u for pelvic sono or as needed.

## 2024-04-13 NOTE — PHYSICAL EXAM
[Chaperone Present] : A chaperone was present in the examining room during all aspects of the physical examination [Appropriately responsive] : appropriately responsive [Alert] : alert [No Acute Distress] : no acute distress [Soft] : soft [Non-tender] : non-tender [Non-distended] : non-distended [Oriented x3] : oriented x3 [Examination Of The Breasts] : a normal appearance [No Discharge] : no discharge [No Masses] : no breast masses were palpable [Labia Majora] : normal [Labia Minora] : normal [No Bleeding] : There was no active vaginal bleeding [Normal] : normal [Uterine Adnexae] : normal [] : implants [Atrophy] : atrophy [Dry Mucosa] : dry mucosa [FreeTextEntry1] : Sebaceous cyst noted.  [FreeTextEntry5] : Unable to enter cervical brush, secondary to stenotic os.

## 2024-04-13 NOTE — REASON FOR VISIT
[Annual] : an annual visit. [FreeTextEntry2] : PT C/O OF LOWER GROIN AIN. ALSO HAS SMALL BUMPS ON INNER GROIN (TREATING WITH BACITRACIN)

## 2024-04-13 NOTE — HISTORY OF PRESENT ILLNESS
[Y] : Positive pregnancy history [Menarche Age: ____] : age at menarche was [unfilled] [Currently Active] : currently active [FreeTextEntry1] : ADALI 52 yo  LMP  is here today for an annual exam. She is sexually active but denies any vaginal dryness symptoms.   Patient complains of LT lower groin pain for about one month. Also notes some small bumps on the inner part of her groin that come out when she is stressed out. She is putting Bacitracin ointment on the bumps. Denies any pain with urination.   Last mammogram and breast sonogram on 2024 revealed BR2.  Last pap smear on 09/15/2022 was normal, HPV negative.   Last DEXA scan on 24 was normal.  Last ColoGard was in  as per patient.  [Mammogramdate] : 2/26/24 [TextBox_19] : br2 [BreastSonogramDate] : 2/26/24 [TextBox_25] : br2 [PapSmeardate] : 9/15/22 [TextBox_31] : wnl [BoneDensityDate] : 2/26/24 [TextBox_37] : normal [ColonoscopyDate] : [facility]- 2024 [TextBox_43] : WNL PER PT  [HPVDate] : 9/15/22 [TextBox_78] : neg [LMPDate] : 2008 [PGHxTotal] : 2 [[facility] HospitalxFulerm] : 1 [[facility]iving] : 1 [PGHxABSpont] : 1

## 2024-04-13 NOTE — END OF VISIT
[FreeTextEntry3] : I, Tari Keller solely acted as scribe for Dr. Rosalia Alvarez on 04/08/2024  All medical entries made by the Scribe were at my, Dr. Morton, direction and personally dictated by me on 04/08/2024 . I have reviewed the chart and agree that the record accurately reflects my personal performance of the history, physical exam, assessment and plan. I have also personally directed, reviewed, and agreed with the chart.

## 2024-06-21 ENCOUNTER — APPOINTMENT (OUTPATIENT)
Dept: ANTEPARTUM | Facility: CLINIC | Age: 54
End: 2024-06-21
Payer: COMMERCIAL

## 2024-06-21 ENCOUNTER — APPOINTMENT (OUTPATIENT)
Dept: OBGYN | Facility: CLINIC | Age: 54
End: 2024-06-21
Payer: COMMERCIAL

## 2024-06-21 ENCOUNTER — ASOB RESULT (OUTPATIENT)
Age: 54
End: 2024-06-21

## 2024-06-21 VITALS
SYSTOLIC BLOOD PRESSURE: 108 MMHG | HEIGHT: 64 IN | DIASTOLIC BLOOD PRESSURE: 68 MMHG | BODY MASS INDEX: 29.02 KG/M2 | WEIGHT: 170 LBS

## 2024-06-21 DIAGNOSIS — R10.32 LEFT LOWER QUADRANT PAIN: ICD-10-CM

## 2024-06-21 DIAGNOSIS — Z01.411 ENCOUNTER FOR GYNECOLOGICAL EXAMINATION (GENERAL) (ROUTINE) WITH ABNORMAL FINDINGS: ICD-10-CM

## 2024-06-21 DIAGNOSIS — N39.3 STRESS INCONTINENCE (FEMALE) (MALE): ICD-10-CM

## 2024-06-21 DIAGNOSIS — Z01.419 ENCOUNTER FOR GYNECOLOGICAL EXAMINATION (GENERAL) (ROUTINE) W/OUT ABNORMAL FINDINGS: ICD-10-CM

## 2024-06-21 DIAGNOSIS — R92.8 OTHER ABNORMAL AND INCONCLUSIVE FINDINGS ON DIAGNOSTIC IMAGING OF BREAST: ICD-10-CM

## 2024-06-21 DIAGNOSIS — R10.2 PELVIC AND PERINEAL PAIN: ICD-10-CM

## 2024-06-21 DIAGNOSIS — N94.10 UNSPECIFIED DYSPAREUNIA: ICD-10-CM

## 2024-06-21 DIAGNOSIS — N64.59 OTHER SIGNS AND SYMPTOMS IN BREAST: ICD-10-CM

## 2024-06-21 PROCEDURE — 76830 TRANSVAGINAL US NON-OB: CPT

## 2024-06-21 PROCEDURE — 99214 OFFICE O/P EST MOD 30 MIN: CPT

## 2024-06-21 PROCEDURE — 76856 US EXAM PELVIC COMPLETE: CPT | Mod: 59

## 2024-06-21 RX ORDER — ESTRADIOL 10 UG/1
10 TABLET VAGINAL
Qty: 54 | Refills: 1 | Status: ACTIVE | COMMUNITY
Start: 2024-06-21 | End: 1900-01-01

## 2024-06-23 PROBLEM — Z01.419 ENCOUNTER FOR WELL WOMAN EXAM WITH ROUTINE GYNECOLOGICAL EXAM: Status: RESOLVED | Noted: 2022-09-15 | Resolved: 2024-06-23

## 2024-06-23 PROBLEM — R92.8 ABNORMAL FINDING ON BREAST IMAGING: Status: RESOLVED | Noted: 2018-06-27 | Resolved: 2024-06-23

## 2024-06-23 NOTE — HISTORY OF PRESENT ILLNESS
[postmenopausal] : postmenopausal [N] : Patient does not use contraception [Y] : Positive pregnancy history [No] : Patient does not have concerns regarding sex [Currently Active] : currently active [Men] : men [Mammogramdate] : 02/26/24 [TextBox_19] : BR2 [BreastSonogramDate] : 02/26/24 [TextBox_25] : BR2 [PapSmeardate] : 04/08/24 [TextBox_31] : WNL [BoneDensityDate] : 02/26/24 [TextBox_37] : NORMAL [ColonoscopyDate] : [facility] 2024 [HPVDate] : 04/08/24 [TextBox_78] : NEG [LMPDate] : 2008 [PGHxTotal] : 2 [[facility] HospitalxFulerm] : 1 [[facility] Centeriving] : 1 [PGHxABSpont] : 1 [FreeTextEntry1] : 2008

## 2024-06-23 NOTE — PLAN
[FreeTextEntry1] : Sono results were wnl. We discussed my recommendation to consult with her PCP if the pelvic pain continues. Repeat gynecologic imaging not currently recommended.   We discussed that her vaginal pain may be from vaginal dryness. Painful sex is likely secondary to vaginal atrophy which is common in perimenopause and menopause, this is referred to as genitourinary syndrome of menopause.  We discussed menopausal changes in the vagina and bladder secondary to the significant reduction in estrogen. I explained that unlike the other symptoms of menopause that typically resolve over time, genitourinary syndrome of menopause typically worsens if left untreated.   We discussed the use of over the counter vaginal lubricants for intercourse, including the use of coconut oil both internally and externally.   We also discussed the benefits of over-the-counter vaginal moisturizers. We discussed the fact that these are recommended to be used regularly 2-3 times a week for maximum benefit.   We also discussed the benefits and roles of local menopausal hormone therapy via vaginal estrogen. She is aware that there is a minimal to insignificant absorption into the bloodstream with vaginal estrogen. This is regarded as very safe by the FDA. We even discussed that ACOG recommends it as a treatment option even in women with a history of breast cancer.   We discussed the different formulations of vaginal estrogen therapy including estrogen cream, vaginal estrogen tablets, and a vaginal estrogen ring. Prescription was sent to the pharmacy for vagifem. She is aware that for the first 2 weeks, she needs to insert the tablet vaginally nightly, and then after the first 2 weeks it is 2 times per week. She is aware that she may not have a significant improvement in symptoms for 3 months. If there is not significant improvement after 3 months, she was instructed to follow up. She is aware that she can also use both vaginal moisturizers and vaginal estrogen for added improvement. She is aware she would need to alternate the insertion of both, and I recommend against inserting both at the same time.   She is aware that if she has any vaginal bleeding after the first 3 months of initiating treatment, she should return for a uterine evaluation. She is also aware that she may still require lubrication for intercourse. Questions answered.  F/u annually or as needed.

## 2024-06-23 NOTE — END OF VISIT
[FreeTextEntry3] : I, Tari Keller solely acted as scribe for Dr. Rosalia Alvarez on 06/21/2024  All medical entries made by the Scribe were at my, Dr. Morton, direction and personally dictated by me on 06/21/2024 . I have reviewed the chart and agree that the record accurately reflects my personal performance of the history, physical exam, assessment and plan. I have also personally directed, reviewed, and agreed with the chart.

## 2024-06-23 NOTE — REVIEW OF SYSTEMS
[Pelvic pain] : pelvic pain [Incontinence] : incontinence [Genital Rash/Irritation] : genital rash/irritation

## 2024-10-29 ENCOUNTER — APPOINTMENT (OUTPATIENT)
Dept: ULTRASOUND IMAGING | Facility: CLINIC | Age: 54
End: 2024-10-29
Payer: COMMERCIAL

## 2024-10-29 ENCOUNTER — OUTPATIENT (OUTPATIENT)
Dept: OUTPATIENT SERVICES | Facility: HOSPITAL | Age: 54
LOS: 1 days | End: 2024-10-29
Payer: COMMERCIAL

## 2024-10-29 ENCOUNTER — APPOINTMENT (OUTPATIENT)
Dept: RADIOLOGY | Facility: CLINIC | Age: 54
End: 2024-10-29
Payer: COMMERCIAL

## 2024-10-29 DIAGNOSIS — Z00.8 ENCOUNTER FOR OTHER GENERAL EXAMINATION: ICD-10-CM

## 2024-10-29 PROCEDURE — 76641 ULTRASOUND BREAST COMPLETE: CPT

## 2024-10-29 PROCEDURE — 73130 X-RAY EXAM OF HAND: CPT

## 2024-10-29 PROCEDURE — 73130 X-RAY EXAM OF HAND: CPT | Mod: 26,50

## 2024-10-29 PROCEDURE — 76641 ULTRASOUND BREAST COMPLETE: CPT | Mod: 26,LT

## 2025-04-14 ENCOUNTER — OFFICE (OUTPATIENT)
Dept: URBAN - METROPOLITAN AREA CLINIC 116 | Facility: CLINIC | Age: 55
Setting detail: OPHTHALMOLOGY
End: 2025-04-14
Payer: COMMERCIAL

## 2025-04-14 VITALS — HEIGHT: 55 IN

## 2025-04-14 DIAGNOSIS — H52.4: ICD-10-CM

## 2025-04-14 DIAGNOSIS — H01.001: ICD-10-CM

## 2025-04-14 DIAGNOSIS — H04.123: ICD-10-CM

## 2025-04-14 DIAGNOSIS — H25.13: ICD-10-CM

## 2025-04-14 PROBLEM — E11.9 DIABETES TYPE 2 NO RETINOPATHY; BOTH MILD WITHOUT ME; 
BOTH EYES: Status: ACTIVE | Noted: 2025-04-14

## 2025-04-14 PROBLEM — E11.3293 DIABETES TYPE 2 NO RETINOPATHY; BOTH MILD WITHOUT ME; 
BOTH EYES: Status: ACTIVE | Noted: 2025-04-14

## 2025-04-14 PROCEDURE — 92015 DETERMINE REFRACTIVE STATE: CPT | Performed by: OPTOMETRIST

## 2025-04-14 PROCEDURE — 92250 FUNDUS PHOTOGRAPHY W/I&R: CPT | Performed by: OPTOMETRIST

## 2025-04-14 PROCEDURE — 92014 COMPRE OPH EXAM EST PT 1/>: CPT | Performed by: OPTOMETRIST

## 2025-04-14 ASSESSMENT — REFRACTION_MANIFEST
OS_VA1: 20/20
OD_VA1: 20/20
OS_SPHERE: PLANO
OS_VA1: 20/20
OD_SPHERE: +0.25
OD_SPHERE: PL
OS_AXIS: 85
OD_ADD: +2.25
OS_ADD: +2.25
OS_AXIS: 080
OS_SPHERE: +0.25
OS_CYLINDER: -0.75
OD_VA1: 20/20
OD_CYLINDER: -1.00
OD_AXIS: 100
OS_AXIS: 080
OS_CYLINDER: -0.75
OD_CYLINDER: -1.25
OD_ADD: +1.50
OD_ADD: +2.00
OS_ADD: +1.50
OS_ADD: +1.75
OD_CYLINDER: -1.00
OS_VA1: 20/20
OD_AXIS: 100
OS_SPHERE: +0.25
OD_AXIS: 95
OS_VA2: 20/20
OD_ADD: +1.75
OS_SPHERE: PL
OD_VA1: 20/20
OD_CYLINDER: -1.25
OS_AXIS: 080
OS_ADD: +2.00
OU_VA: 20/20
OD_VA1: 20/20
OD_AXIS: 100
OD_SPHERE: +0.25
OD_SPHERE: +0.50
OD_VA2: 20/20
OS_CYLINDER: -1.25
OS_VA1: 20/20
OS_CYLINDER: -0.75

## 2025-04-14 ASSESSMENT — REFRACTION_CURRENTRX
OS_ADD: +1.50
OS_ADD: +1.50
OS_AXIS: 077
OS_AXIS: 080
OD_SPHERE: +0.50
OD_AXIS: 102
OS_OVR_VA: 20/
OD_ADD: +1.50
OS_SPHERE: PLANO
OD_ADD: +1.50
OD_CYLINDER: -1.00
OS_CYLINDER: -0.75
OD_AXIS: 100
OS_CYLINDER: -1.75
OD_OVR_VA: 20/
OD_VPRISM_DIRECTION: PROGS
OD_VPRISM_DIRECTION: PROGS
OS_SPHERE: +0.25
OD_OVR_VA: 20/
OS_OVR_VA: 20/
OD_CYLINDER: -1.25
OD_SPHERE: +0.75
OS_VPRISM_DIRECTION: PROGS
OS_VPRISM_DIRECTION: PROGS

## 2025-04-14 ASSESSMENT — KERATOMETRY
OD_AXISANGLE_DEGREES: 038
OD_K2POWER_DIOPTERS: 44.75
OS_K2POWER_DIOPTERS: 44.75
OS_K1POWER_DIOPTERS: 44.25
OS_AXISANGLE_DEGREES: 156
OD_K1POWER_DIOPTERS: 44.25

## 2025-04-14 ASSESSMENT — REFRACTION_AUTOREFRACTION
OD_AXIS: 106
OD_SPHERE: +0.50
OD_CYLINDER: -1.00
OS_AXIS: 073
OS_SPHERE: +0.75
OS_CYLINDER: -1.00

## 2025-04-14 ASSESSMENT — TONOMETRY
OS_IOP_MMHG: 16
OD_IOP_MMHG: 15

## 2025-04-14 ASSESSMENT — VISUAL ACUITY
OS_BCVA: 20/25
OD_BCVA: 20/30

## 2025-04-14 ASSESSMENT — CONFRONTATIONAL VISUAL FIELD TEST (CVF)
OS_FINDINGS: FULL
OD_FINDINGS: FULL

## 2025-04-14 ASSESSMENT — LID EXAM ASSESSMENTS
OD_BLEPHARITIS: RUL T
OS_BLEPHARITIS: LUL T